# Patient Record
Sex: MALE | Race: WHITE
[De-identification: names, ages, dates, MRNs, and addresses within clinical notes are randomized per-mention and may not be internally consistent; named-entity substitution may affect disease eponyms.]

---

## 2023-03-24 ENCOUNTER — HOSPITAL ENCOUNTER (EMERGENCY)
Dept: HOSPITAL 26 - MED | Age: 63
LOS: 1 days | Discharge: LEFT BEFORE DISCHARGE | End: 2023-03-25
Payer: SELF-PAY

## 2023-03-24 VITALS — BODY MASS INDEX: 21.82 KG/M2 | WEIGHT: 170 LBS | HEIGHT: 74 IN

## 2023-03-24 VITALS — DIASTOLIC BLOOD PRESSURE: 90 MMHG | SYSTOLIC BLOOD PRESSURE: 153 MMHG

## 2023-03-24 DIAGNOSIS — R53.1: Primary | ICD-10-CM

## 2023-03-24 DIAGNOSIS — Z98.890: ICD-10-CM

## 2023-03-24 DIAGNOSIS — Z79.899: ICD-10-CM

## 2023-03-24 PROCEDURE — 99284 EMERGENCY DEPT VISIT MOD MDM: CPT

## 2023-03-24 PROCEDURE — 36415 COLL VENOUS BLD VENIPUNCTURE: CPT

## 2023-03-24 PROCEDURE — 72100 X-RAY EXAM L-S SPINE 2/3 VWS: CPT

## 2023-03-24 PROCEDURE — 82550 ASSAY OF CK (CPK): CPT

## 2023-03-24 PROCEDURE — 85025 COMPLETE CBC W/AUTO DIFF WBC: CPT

## 2023-03-24 PROCEDURE — G0482 DRUG TEST DEF 15-21 CLASSES: HCPCS

## 2023-03-24 PROCEDURE — 80053 COMPREHEN METABOLIC PANEL: CPT

## 2023-03-24 PROCEDURE — 83735 ASSAY OF MAGNESIUM: CPT

## 2023-03-24 NOTE — NUR
RECEIVED IN BED 12 WITH C/O BILATERAL LEG WAEKNESS FOR 3 DAYS, NO TRAUMA NOR 
INJURY Will try lidocaine cream and referred to pain management

## 2023-03-25 VITALS — DIASTOLIC BLOOD PRESSURE: 90 MMHG | SYSTOLIC BLOOD PRESSURE: 153 MMHG

## 2023-03-25 LAB
ALBUMIN FLD-MCNC: 3.4 G/DL (ref 3.4–5)
ANION GAP SERPL CALCULATED.3IONS-SCNC: 11.6 MMOL/L (ref 8–16)
AST SERPL-CCNC: 74 U/L (ref 15–37)
BASOPHILS # BLD AUTO: 0.1 K/UL (ref 0–0.22)
BASOPHILS NFR BLD AUTO: 0.8 % (ref 0–2)
BILIRUB SERPL-MCNC: 0.7 MG/DL (ref 0–1)
BUN SERPL-MCNC: 16 MG/DL (ref 7–18)
CHLORIDE SERPL-SCNC: 103 MMOL/L (ref 98–107)
CO2 SERPL-SCNC: 27.1 MMOL/L (ref 21–32)
CREAT SERPL-MCNC: 0.9 MG/DL (ref 0.6–1.3)
EOSINOPHIL # BLD AUTO: 0.3 K/UL (ref 0–0.4)
EOSINOPHIL NFR BLD AUTO: 3.6 % (ref 0–4)
ERYTHROCYTE [DISTWIDTH] IN BLOOD BY AUTOMATED COUNT: 14.2 % (ref 11.6–13.7)
GFR SERPL CREATININE-BSD FRML MDRD: 110 ML/MIN (ref 90–?)
GLUCOSE SERPL-MCNC: 124 MG/DL (ref 74–106)
HCT VFR BLD AUTO: 45.7 % (ref 36–52)
HGB BLD-MCNC: 15.4 G/DL (ref 12–18)
LYMPHOCYTES # BLD AUTO: 2.5 K/UL (ref 2–11.5)
LYMPHOCYTES NFR BLD AUTO: 35.5 % (ref 20.5–51.1)
MAGNESIUM SERPL-MCNC: 1.7 MG/DL (ref 1.8–2.4)
MCH RBC QN AUTO: 29 PG (ref 27–31)
MCHC RBC AUTO-ENTMCNC: 34 G/DL (ref 33–37)
MCV RBC AUTO: 84.8 FL (ref 80–94)
MONOCYTES # BLD AUTO: 0.4 K/UL (ref 0.8–1)
MONOCYTES NFR BLD AUTO: 6 % (ref 1.7–9.3)
NEUTROPHILS # BLD AUTO: 3.8 K/UL (ref 1.8–7.7)
NEUTROPHILS NFR BLD AUTO: 54.1 % (ref 42.2–75.2)
PLATELET # BLD AUTO: 150 K/UL (ref 140–450)
POTASSIUM SERPL-SCNC: 3.7 MMOL/L (ref 3.5–5.1)
RBC # BLD AUTO: 5.39 MIL/UL (ref 4.2–6.1)
SODIUM SERPL-SCNC: 138 MMOL/L (ref 136–145)
WBC # BLD AUTO: 7 K/UL (ref 4.8–10.8)

## 2023-03-25 NOTE — NUR
PT AWAKE AND GOT OUT OF BED, GOT DRESSED AND SAID "I'M LEAVING, MY RIDE IS 
HERE" PT LEFT, SIGNING AMA

## 2023-03-27 ENCOUNTER — HOSPITAL ENCOUNTER (EMERGENCY)
Dept: HOSPITAL 26 - MED | Age: 63
Discharge: HOME | End: 2023-03-27
Payer: SELF-PAY

## 2023-03-27 VITALS — SYSTOLIC BLOOD PRESSURE: 140 MMHG | DIASTOLIC BLOOD PRESSURE: 87 MMHG

## 2023-03-27 VITALS — WEIGHT: 170 LBS | BODY MASS INDEX: 21.82 KG/M2 | HEIGHT: 74 IN

## 2023-03-27 VITALS — DIASTOLIC BLOOD PRESSURE: 87 MMHG | SYSTOLIC BLOOD PRESSURE: 140 MMHG

## 2023-03-27 DIAGNOSIS — Z79.899: ICD-10-CM

## 2023-03-27 DIAGNOSIS — K02.9: Primary | ICD-10-CM

## 2023-03-27 NOTE — NUR
Patient discharged with v/s stable. Written and verbal after care instructions 
given and explained. 

Patient alert, oriented and verbalized understanding of instructions. 
Ambulatory with steady gait. All questions addressed prior to discharge. ID 
band removed. Patient advised to follow up with PMD. Rx of BACTRIM DS given. 
Patient educated on indication of medication including possible reaction and 
side effects. Opportunity to ask questions provided and answered.